# Patient Record
Sex: MALE | Race: WHITE | Employment: FULL TIME | URBAN - METROPOLITAN AREA
[De-identification: names, ages, dates, MRNs, and addresses within clinical notes are randomized per-mention and may not be internally consistent; named-entity substitution may affect disease eponyms.]

---

## 2022-12-09 LAB
BUN BLDV-MCNC: 22 MG/DL
CALCIUM SERPL-MCNC: 9.2 MG/DL
CHLORIDE BLD-SCNC: 106 MMOL/L
CO2: 25 MMOL/L
CREAT SERPL-MCNC: 1.24 MG/DL
EGFR: 59
GLUCOSE BLD-MCNC: 89 MG/DL
MICROSCOPIC URINE: NORMAL
POTASSIUM SERPL-SCNC: 3.9 MMOL/L
SODIUM BLD-SCNC: 140 MMOL/L

## 2023-07-07 ENCOUNTER — HOSPITAL ENCOUNTER (OUTPATIENT)
Age: 63
Discharge: HOME | End: 2023-07-07
Payer: COMMERCIAL

## 2023-07-07 ENCOUNTER — HOSPITAL ENCOUNTER (OUTPATIENT)
Dept: GENERAL RADIOLOGY | Age: 63
End: 2023-07-07
Payer: COMMERCIAL

## 2023-07-07 DIAGNOSIS — M25.521 ELBOW PAIN, RIGHT: ICD-10-CM

## 2023-07-07 PROCEDURE — 73080 X-RAY EXAM OF ELBOW: CPT

## 2023-09-27 VITALS
TEMPERATURE: 97.2 F | SYSTOLIC BLOOD PRESSURE: 120 MMHG | RESPIRATION RATE: 16 BRPM | DIASTOLIC BLOOD PRESSURE: 80 MMHG | BODY MASS INDEX: 30.21 KG/M2 | WEIGHT: 235.4 LBS | HEIGHT: 74 IN

## 2023-09-27 DIAGNOSIS — M1A.09X0 IDIOPATHIC CHRONIC GOUT OF MULTIPLE SITES WITHOUT TOPHUS: ICD-10-CM

## 2023-09-27 DIAGNOSIS — M19.90 INFLAMMATORY ARTHRITIS: ICD-10-CM

## 2023-09-27 DIAGNOSIS — N18.2 STAGE 2 CHRONIC KIDNEY DISEASE: ICD-10-CM

## 2023-09-27 DIAGNOSIS — I26.94 MULTIPLE SUBSEGMENTAL PULMONARY EMBOLI WITHOUT ACUTE COR PULMONALE (HCC): ICD-10-CM

## 2023-09-27 DIAGNOSIS — K21.9 GASTROESOPHAGEAL REFLUX DISEASE WITHOUT ESOPHAGITIS: ICD-10-CM

## 2023-09-27 DIAGNOSIS — M47.812 CERVICAL SPONDYLOSIS: ICD-10-CM

## 2023-09-27 DIAGNOSIS — M25.529 ELBOW PAIN, UNSPECIFIED LATERALITY: ICD-10-CM

## 2023-09-27 DIAGNOSIS — N52.9 ERECTILE DYSFUNCTION, UNSPECIFIED ERECTILE DYSFUNCTION TYPE: ICD-10-CM

## 2023-09-27 DIAGNOSIS — E78.2 MIXED DYSLIPIDEMIA: ICD-10-CM

## 2023-09-27 DIAGNOSIS — M47.816 LUMBAR SPONDYLOSIS: ICD-10-CM

## 2023-09-27 DIAGNOSIS — R53.82 CHRONIC FATIGUE: ICD-10-CM

## 2023-09-27 DIAGNOSIS — M19.011 PRIMARY OSTEOARTHRITIS OF RIGHT SHOULDER: ICD-10-CM

## 2023-09-27 DIAGNOSIS — F51.01 PRIMARY INSOMNIA: ICD-10-CM

## 2023-09-27 DIAGNOSIS — M54.30 SCIATIC LEG PAIN: ICD-10-CM

## 2023-09-27 RX ORDER — DICLOFENAC POTASSIUM 50 MG/1
TABLET, FILM COATED ORAL
COMMUNITY

## 2023-09-27 RX ORDER — SILDENAFIL 100 MG/1
100 TABLET, FILM COATED ORAL PRN
COMMUNITY

## 2023-09-27 RX ORDER — TRAZODONE HYDROCHLORIDE 50 MG/1
50 TABLET ORAL NIGHTLY
COMMUNITY
Start: 2021-02-17

## 2023-09-28 ENCOUNTER — OFFICE VISIT (OUTPATIENT)
Age: 63
End: 2023-09-28
Payer: COMMERCIAL

## 2023-09-28 VITALS
HEART RATE: 68 BPM | RESPIRATION RATE: 14 BRPM | BODY MASS INDEX: 30.16 KG/M2 | HEIGHT: 74 IN | DIASTOLIC BLOOD PRESSURE: 80 MMHG | SYSTOLIC BLOOD PRESSURE: 130 MMHG | TEMPERATURE: 97.2 F | WEIGHT: 235 LBS

## 2023-09-28 DIAGNOSIS — M25.521 ELBOW PAIN, CHRONIC, RIGHT: ICD-10-CM

## 2023-09-28 DIAGNOSIS — G89.29 ELBOW PAIN, CHRONIC, RIGHT: ICD-10-CM

## 2023-09-28 DIAGNOSIS — R79.89 ELEVATED SERUM CREATININE: ICD-10-CM

## 2023-09-28 DIAGNOSIS — M10.9 GOUT OF LEFT FOOT, UNSPECIFIED CAUSE, UNSPECIFIED CHRONICITY: ICD-10-CM

## 2023-09-28 DIAGNOSIS — R06.02 SHORTNESS OF BREATH: ICD-10-CM

## 2023-09-28 DIAGNOSIS — Z00.00 WELL ADULT EXAM: ICD-10-CM

## 2023-09-28 DIAGNOSIS — M19.90 INFLAMMATORY ARTHRITIS: Primary | ICD-10-CM

## 2023-09-28 DIAGNOSIS — N18.2 STAGE 2 CHRONIC KIDNEY DISEASE: ICD-10-CM

## 2023-09-28 DIAGNOSIS — Z12.5 PROSTATE CANCER SCREENING: ICD-10-CM

## 2023-09-28 PROCEDURE — 99214 OFFICE O/P EST MOD 30 MIN: CPT | Performed by: FAMILY MEDICINE

## 2023-09-28 RX ORDER — VALACYCLOVIR HYDROCHLORIDE 1 G/1
2000 TABLET, FILM COATED ORAL 2 TIMES DAILY
Qty: 4 TABLET | Refills: 4 | Status: SHIPPED | OUTPATIENT
Start: 2023-09-28

## 2023-09-28 RX ORDER — COLCHICINE 0.6 MG/1
TABLET ORAL
Qty: 30 TABLET | Refills: 1 | Status: SHIPPED | OUTPATIENT
Start: 2023-09-28

## 2023-09-28 SDOH — ECONOMIC STABILITY: HOUSING INSECURITY
IN THE LAST 12 MONTHS, WAS THERE A TIME WHEN YOU DID NOT HAVE A STEADY PLACE TO SLEEP OR SLEPT IN A SHELTER (INCLUDING NOW)?: NO

## 2023-09-28 SDOH — ECONOMIC STABILITY: INCOME INSECURITY: HOW HARD IS IT FOR YOU TO PAY FOR THE VERY BASICS LIKE FOOD, HOUSING, MEDICAL CARE, AND HEATING?: NOT HARD AT ALL

## 2023-09-28 SDOH — ECONOMIC STABILITY: FOOD INSECURITY: WITHIN THE PAST 12 MONTHS, YOU WORRIED THAT YOUR FOOD WOULD RUN OUT BEFORE YOU GOT MONEY TO BUY MORE.: NEVER TRUE

## 2023-09-28 SDOH — ECONOMIC STABILITY: FOOD INSECURITY: WITHIN THE PAST 12 MONTHS, THE FOOD YOU BOUGHT JUST DIDN'T LAST AND YOU DIDN'T HAVE MONEY TO GET MORE.: NEVER TRUE

## 2023-09-28 ASSESSMENT — PATIENT HEALTH QUESTIONNAIRE - PHQ9
2. FEELING DOWN, DEPRESSED OR HOPELESS: 0
SUM OF ALL RESPONSES TO PHQ QUESTIONS 1-9: 0
SUM OF ALL RESPONSES TO PHQ QUESTIONS 1-9: 0
SUM OF ALL RESPONSES TO PHQ9 QUESTIONS 1 & 2: 0
SUM OF ALL RESPONSES TO PHQ QUESTIONS 1-9: 0
SUM OF ALL RESPONSES TO PHQ QUESTIONS 1-9: 0
1. LITTLE INTEREST OR PLEASURE IN DOING THINGS: 0

## 2023-09-28 NOTE — ASSESSMENT & PLAN NOTE
Last visit :  Reviewed normal CBC, creatinine 1.25 which is stable, earlier this year it was 1.24, last spring it was 1.45 after stopping NSAIDs this has come down near his baseline, continue to monitor, adequate hydration encouraged, avoid NSAIDs.

## 2023-10-02 PROBLEM — M10.9 GOUT OF LEFT FOOT: Status: ACTIVE | Noted: 2023-10-02

## 2023-10-02 RX ORDER — SILDENAFIL 100 MG/1
TABLET, FILM COATED ORAL
Qty: 6 TABLET | Refills: 3 | Status: SHIPPED | OUTPATIENT
Start: 2023-10-02

## 2023-10-02 ASSESSMENT — ENCOUNTER SYMPTOMS: BACK PAIN: 1

## 2023-10-02 NOTE — TELEPHONE ENCOUNTER
Jay Rock is calling to request a refill on the following medication(s):    Medication Request:  Requested Prescriptions     Pending Prescriptions Disp Refills    sildenafil (VIAGRA) 100 MG tablet [Pharmacy Med Name: SILDENAFIL 100 MG TABLET] 6 tablet      Sig: TAKE 1 TABLET BY MOUTH 1 HOUR PRIOR TO INTERCOURSE AS NEEDED       Last Visit Date (If Applicable):  1/18/5650    Next Visit Date:    5/30/2024

## 2023-10-03 NOTE — ASSESSMENT & PLAN NOTE
continue to follow with Ortho, contact them for results of EMG whether he has carpal tunnel and further treatment of this.   He should follow-up with Ortho regarding any possibility for short term disability if he has carpal tunnel procedure or surgery Burow's Advancement Flap Text: The defect edges were debeveled with a #15 scalpel blade.  Given the location of the defect and the proximity to free margins a Burow's advancement flap was deemed most appropriate.  Using a sterile surgical marker, the appropriate advancement flap was drawn incorporating the defect and placing the expected incisions within the relaxed skin tension lines where possible.    The area thus outlined was incised deep to adipose tissue with a #15 scalpel blade.  The skin margins were undermined to an appropriate distance in all directions utilizing iris scissors.

## 2023-10-03 NOTE — ASSESSMENT & PLAN NOTE
he is not taking anything for arthritis at this time. He was on low-dose prednisone per rheumatology but he discontinued this as well as other anti-inflammatories he had tried due to it causing elevation of serum creatinine. Takes Tylenol as needed.   No longer following with rheumatology per his preference

## 2023-11-20 ENCOUNTER — TELEPHONE (OUTPATIENT)
Age: 63
End: 2023-11-20

## 2023-11-20 RX ORDER — AZITHROMYCIN 250 MG/1
250 TABLET, FILM COATED ORAL SEE ADMIN INSTRUCTIONS
Qty: 6 TABLET | Refills: 0 | Status: SHIPPED | OUTPATIENT
Start: 2023-11-20 | End: 2023-11-25

## 2023-11-20 NOTE — TELEPHONE ENCOUNTER
Patient has a headache, sore throat, sinus drainage, yellow mucus. Symptoms started about 2 days ago. Worse last night and today feels terrible.      Send to [facility]    (Patient heading home now and will take a covid test and call us back)

## 2024-05-09 NOTE — TELEPHONE ENCOUNTER
Patient is requesting a refill on Trazodone 50mg He would like sent to [facility] in [place] phone [phone] . [address] [facility] [place] [facility]

## 2024-05-09 NOTE — TELEPHONE ENCOUNTER
Ad Ryan is calling to request a refill on the following medication(s):    Medication Request:  Requested Prescriptions     Pending Prescriptions Disp Refills    traZODone (DESYREL) 50 MG tablet 30 tablet 0     Sig: Take 1 tablet by mouth nightly       Last Visit Date (If Applicable):  9/28/2023    Next Visit Date:    Visit date not found

## 2024-05-12 RX ORDER — TRAZODONE HYDROCHLORIDE 50 MG/1
50 TABLET ORAL NIGHTLY
Qty: 30 TABLET | Refills: 5 | Status: SHIPPED | OUTPATIENT
Start: 2024-05-12 | End: 2024-06-11

## 2024-06-12 ENCOUNTER — TELEPHONE (OUTPATIENT)
Age: 64
End: 2024-06-12

## 2024-06-12 NOTE — TELEPHONE ENCOUNTER
I faxed medical request forms from [place] dept of health division of disability determination to [facility] medical records department.

## 2024-08-06 ENCOUNTER — HOSPITAL ENCOUNTER (OUTPATIENT)
Age: 64
Discharge: HOME | End: 2024-08-06
Payer: COMMERCIAL

## 2024-08-06 DIAGNOSIS — Z12.5 PROSTATE CANCER SCREENING: ICD-10-CM

## 2024-08-06 DIAGNOSIS — Z00.00 WELL ADULT EXAM: ICD-10-CM

## 2024-08-06 DIAGNOSIS — M10.9 GOUT OF LEFT FOOT, UNSPECIFIED CAUSE, UNSPECIFIED CHRONICITY: ICD-10-CM

## 2024-08-06 LAB
ALBUMIN SERPL-MCNC: 4.6 G/DL (ref 3.5–5.2)
ALBUMIN/GLOB SERPL: 2.1 {RATIO} (ref 1–2.5)
ALP SERPL-CCNC: 84 U/L (ref 40–129)
ALT SERPL-CCNC: 20 U/L (ref 5–41)
ANION GAP SERPL CALCULATED.3IONS-SCNC: 12 MMOL/L (ref 9–17)
AST SERPL-CCNC: 18 U/L
BILIRUB DIRECT SERPL-MCNC: <0.1 MG/DL
BILIRUB INDIRECT SERPL-MCNC: NORMAL MG/DL (ref 0–1)
BILIRUB SERPL-MCNC: 0.8 MG/DL (ref 0.3–1.2)
BUN SERPL-MCNC: 17 MG/DL (ref 8–23)
CALCIUM SERPL-MCNC: 9.3 MG/DL (ref 8.6–10.4)
CHLORIDE SERPL-SCNC: 101 MMOL/L (ref 98–107)
CHOLEST SERPL-MCNC: 204 MG/DL (ref 0–199)
CHOLESTEROL/HDL RATIO: 5
CO2 SERPL-SCNC: 26 MMOL/L (ref 20–31)
CREAT SERPL-MCNC: 1.2 MG/DL (ref 0.7–1.2)
EST. AVERAGE GLUCOSE BLD GHB EST-MCNC: 103 MG/DL
GFR, ESTIMATED: 69 ML/MIN/1.73M2
GLUCOSE SERPL-MCNC: 92 MG/DL (ref 70–99)
HBA1C MFR BLD: 5.2 % (ref 4–6)
HDLC SERPL-MCNC: 41 MG/DL
LDLC SERPL CALC-MCNC: 130 MG/DL (ref 0–100)
POTASSIUM SERPL-SCNC: 4.1 MMOL/L (ref 3.7–5.3)
PROT SERPL-MCNC: 6.8 G/DL (ref 6.4–8.3)
PSA SERPL-MCNC: 2 NG/ML (ref 0–4)
SODIUM SERPL-SCNC: 139 MMOL/L (ref 135–144)
TRIGL SERPL-MCNC: 164 MG/DL (ref 0–149)
URATE SERPL-MCNC: 8.3 MG/DL (ref 3.4–7)
VLDLC SERPL CALC-MCNC: 33 MG/DL

## 2024-08-06 PROCEDURE — 82248 BILIRUBIN DIRECT: CPT

## 2024-08-06 PROCEDURE — 36415 COLL VENOUS BLD VENIPUNCTURE: CPT

## 2024-08-06 PROCEDURE — 84550 ASSAY OF BLOOD/URIC ACID: CPT

## 2024-08-06 PROCEDURE — 80061 LIPID PANEL: CPT

## 2024-08-06 PROCEDURE — 83036 HEMOGLOBIN GLYCOSYLATED A1C: CPT

## 2024-08-06 PROCEDURE — 80053 COMPREHEN METABOLIC PANEL: CPT

## 2024-08-06 PROCEDURE — G0103 PSA SCREENING: HCPCS

## 2024-08-14 ENCOUNTER — OFFICE VISIT (OUTPATIENT)
Age: 64
End: 2024-08-14
Payer: COMMERCIAL

## 2024-08-14 VITALS
TEMPERATURE: 98.2 F | HEIGHT: 74 IN | DIASTOLIC BLOOD PRESSURE: 80 MMHG | SYSTOLIC BLOOD PRESSURE: 130 MMHG | WEIGHT: 241.2 LBS | OXYGEN SATURATION: 96 % | HEART RATE: 67 BPM | BODY MASS INDEX: 30.96 KG/M2

## 2024-08-14 DIAGNOSIS — M10.9 GOUT OF LEFT FOOT, UNSPECIFIED CAUSE, UNSPECIFIED CHRONICITY: ICD-10-CM

## 2024-08-14 DIAGNOSIS — G89.29 ELBOW PAIN, CHRONIC, RIGHT: ICD-10-CM

## 2024-08-14 DIAGNOSIS — R39.11 BENIGN PROSTATIC HYPERPLASIA WITH URINARY HESITANCY: ICD-10-CM

## 2024-08-14 DIAGNOSIS — N40.1 BENIGN PROSTATIC HYPERPLASIA WITH URINARY HESITANCY: ICD-10-CM

## 2024-08-14 DIAGNOSIS — M25.521 ELBOW PAIN, CHRONIC, RIGHT: ICD-10-CM

## 2024-08-14 DIAGNOSIS — Z12.5 PROSTATE CANCER SCREENING: ICD-10-CM

## 2024-08-14 DIAGNOSIS — R79.89 ELEVATED SERUM CREATININE: Primary | ICD-10-CM

## 2024-08-14 DIAGNOSIS — M19.90 INFLAMMATORY ARTHRITIS: ICD-10-CM

## 2024-08-14 PROCEDURE — 99214 OFFICE O/P EST MOD 30 MIN: CPT | Performed by: FAMILY MEDICINE

## 2024-08-14 RX ORDER — TAMSULOSIN HYDROCHLORIDE 0.4 MG/1
0.4 CAPSULE ORAL DAILY
Qty: 30 CAPSULE | Refills: 3 | Status: SHIPPED | OUTPATIENT
Start: 2024-08-14

## 2024-08-14 RX ORDER — COLCHICINE 0.6 MG/1
0.6 TABLET ORAL DAILY
Qty: 30 TABLET | Refills: 5 | Status: SHIPPED | OUTPATIENT
Start: 2024-08-14

## 2024-08-14 ASSESSMENT — PATIENT HEALTH QUESTIONNAIRE - PHQ9
SUM OF ALL RESPONSES TO PHQ QUESTIONS 1-9: 0
SUM OF ALL RESPONSES TO PHQ QUESTIONS 1-9: 0
2. FEELING DOWN, DEPRESSED OR HOPELESS: NOT AT ALL
SUM OF ALL RESPONSES TO PHQ QUESTIONS 1-9: 0
SUM OF ALL RESPONSES TO PHQ9 QUESTIONS 1 & 2: 0
1. LITTLE INTEREST OR PLEASURE IN DOING THINGS: NOT AT ALL
SUM OF ALL RESPONSES TO PHQ QUESTIONS 1-9: 0

## 2024-08-14 NOTE — ASSESSMENT & PLAN NOTE
numbness and tingling in the hand improved with ulnar release done in the last year.  Continues to have arthritic symptoms which affect his ability to fully extend the elbow and perform ADLs with that arm.  Discussed trying colchicine daily for arthritis and Tylenol.

## 2024-08-14 NOTE — PROGRESS NOTES
[facility]     Date of Visit:  2024  Patient Name: Ad Ryan   Patient :  1960     CHIEF COMPLAINT/HPI:     Ad Ryan is a 63 y.o. male who presents today for an general visit to be evaluated for the following condition(s):  Chief Complaint   Patient presents with    Blood Work     He is here to discuss his labs.  He says that his arthritis is really getting worse.      Patient here for routine visit.  He built a house in [place] and will be there most of the year but plans to return here occasionally.  His arthritis symptoms are debilitating.  His hands are stiff and painful.  It affects his ability to  things, opening jars, and fine motor abilities.  His right elbow cannot be fully extended due to arthritis.  He did have numbness in his hand, had ulnar release by Ortho in the fall and that has helped some the numbness in the hand.  His right shoulder is starting to ache as well.  No injury.  Rheumatology had him on prednisone and other anti-inflammatories which caused side effects and acute kidney injury.  Denies chest pain or shortness of breath.  He is going to try to wean off trazodone, taking valerian root.  He takes colchicine as needed for gout but has not had any flareups.  He has noted some difficulty urinating, getting urine stream started more recently but no dysuria or hematuria.  REVIEW OF SYSTEM      Negative other than noted above      REVIEWED INFORMATION      Allergies   Allergen Reactions    Diclofenac Potassium      YASH    Meloxicam      YASH       Current Outpatient Medications   Medication Sig Dispense Refill    tamsulosin (FLOMAX) 0.4 MG capsule Take 1 capsule by mouth daily 30 capsule 3    colchicine (COLCRYS) 0.6 MG tablet Take 1 tablet by mouth daily 2 tabs at onset of gout, repeat 1 tab 1 hour later if symptoms persist; max 3 tabs daily 30 tablet 5    traZODone (DESYREL) 50 MG tablet Take 1 tablet by mouth nightly 30 tablet 5    sildenafil

## 2024-08-14 NOTE — ASSESSMENT & PLAN NOTE
discussed taking Tylenol Extra Strength 500 mg tablets take 2 tabs 2-3 times daily, start colchicine daily, check BMP in a month to make sure no YASH with taking colchicine regularly.  Did not tolerate meloxicam or diclofenac or prednisone per her rheumatologist.  He is applying full disability as the arthritis limits his ability to do his work affecting fine motor ability strength of hands and use of elbow.

## 2024-09-12 ENCOUNTER — TELEPHONE (OUTPATIENT)
Age: 64
End: 2024-09-12

## 2024-09-12 DIAGNOSIS — M10.9 GOUT OF LEFT FOOT, UNSPECIFIED CAUSE, UNSPECIFIED CHRONICITY: ICD-10-CM

## 2024-09-13 RX ORDER — COLCHICINE 0.6 MG/1
0.6 TABLET ORAL DAILY
Qty: 30 TABLET | Refills: 5 | Status: SHIPPED | OUTPATIENT
Start: 2024-09-13

## 2024-09-13 RX ORDER — TAMSULOSIN HYDROCHLORIDE 0.4 MG/1
0.4 CAPSULE ORAL DAILY
Qty: 30 CAPSULE | Refills: 5 | Status: SHIPPED | OUTPATIENT
Start: 2024-09-13

## 2024-09-16 RX ORDER — TRAZODONE HYDROCHLORIDE 50 MG/1
50 TABLET, FILM COATED ORAL NIGHTLY
Qty: 30 TABLET | Refills: 5 | Status: SHIPPED | OUTPATIENT
Start: 2024-09-16

## 2024-10-04 LAB
BUN BLDV-MCNC: 15 MG/DL
CALCIUM SERPL-MCNC: 9.6 MG/DL
CHLORIDE BLD-SCNC: 104 MMOL/L
CO2: 29 MMOL/L
CREAT SERPL-MCNC: 1.31 MG/DL
EGFR: NORMAL
GLUCOSE BLD-MCNC: 92 MG/DL
POTASSIUM SERPL-SCNC: 4.7 MMOL/L
SODIUM BLD-SCNC: 141 MMOL/L

## 2024-10-08 DIAGNOSIS — R79.89 ELEVATED SERUM CREATININE: ICD-10-CM

## 2024-10-30 ENCOUNTER — TELEPHONE (OUTPATIENT)
Age: 64
End: 2024-10-30

## 2024-10-30 NOTE — TELEPHONE ENCOUNTER
PT called to say that he may not be back in town for the 11/6/24 office visit and mentioned if not in town, do a virtual visit.? He did say it was just to discuss new meds and blood work and it could be something that can be done over phone.

## 2024-10-30 NOTE — TELEPHONE ENCOUNTER
It is difficult to do virtual visits at the time he is scheduled, may be if the 1220 patient could switch appointment slots with him so that he is at the 1220 slot, that would be a better time for virtual visit.

## 2024-11-06 ENCOUNTER — TELEMEDICINE (OUTPATIENT)
Age: 64
End: 2024-11-06
Payer: COMMERCIAL

## 2024-11-06 DIAGNOSIS — N40.1 BENIGN PROSTATIC HYPERPLASIA WITH URINARY HESITANCY: Primary | ICD-10-CM

## 2024-11-06 DIAGNOSIS — M19.90 INFLAMMATORY ARTHRITIS: ICD-10-CM

## 2024-11-06 DIAGNOSIS — R39.11 BENIGN PROSTATIC HYPERPLASIA WITH URINARY HESITANCY: Primary | ICD-10-CM

## 2024-11-06 DIAGNOSIS — Z86.711 HISTORY OF PULMONARY EMBOLISM: ICD-10-CM

## 2024-11-06 DIAGNOSIS — N18.2 STAGE 2 CHRONIC KIDNEY DISEASE: ICD-10-CM

## 2024-11-06 DIAGNOSIS — I26.94 MULTIPLE SUBSEGMENTAL PULMONARY EMBOLI WITHOUT ACUTE COR PULMONALE (HCC): ICD-10-CM

## 2024-11-06 DIAGNOSIS — M10.9 GOUT OF LEFT FOOT, UNSPECIFIED CAUSE, UNSPECIFIED CHRONICITY: ICD-10-CM

## 2024-11-06 PROCEDURE — 99214 OFFICE O/P EST MOD 30 MIN: CPT | Performed by: FAMILY MEDICINE

## 2024-11-06 RX ORDER — NABUMETONE 500 MG/1
500 TABLET, FILM COATED ORAL 2 TIMES DAILY
Qty: 60 TABLET | Refills: 5 | Status: SHIPPED | OUTPATIENT
Start: 2024-11-06

## 2024-11-06 RX ORDER — DIMENHYDRINATE 50 MG
TABLET ORAL
COMMUNITY

## 2024-11-06 RX ORDER — TADALAFIL 5 MG/1
5 TABLET ORAL DAILY
Qty: 30 TABLET | Refills: 5 | Status: SHIPPED | OUTPATIENT
Start: 2024-11-06

## 2024-11-06 RX ORDER — ASPIRIN 81 MG/1
TABLET, CHEWABLE ORAL
COMMUNITY

## 2024-11-06 RX ORDER — HYDROCODONE BITARTRATE AND ACETAMINOPHEN 5; 325 MG/1; MG/1
1-2 TABLET ORAL EVERY 6 HOURS PRN
COMMUNITY
Start: 2023-10-18

## 2024-11-06 RX ORDER — SILDENAFIL 100 MG/1
100 TABLET, FILM COATED ORAL PRN
Qty: 6 TABLET | Refills: 3 | Status: SHIPPED | OUTPATIENT
Start: 2024-11-06

## 2024-11-06 SDOH — ECONOMIC STABILITY: FOOD INSECURITY: WITHIN THE PAST 12 MONTHS, THE FOOD YOU BOUGHT JUST DIDN'T LAST AND YOU DIDN'T HAVE MONEY TO GET MORE.: NEVER TRUE

## 2024-11-06 SDOH — ECONOMIC STABILITY: INCOME INSECURITY: HOW HARD IS IT FOR YOU TO PAY FOR THE VERY BASICS LIKE FOOD, HOUSING, MEDICAL CARE, AND HEATING?: NOT VERY HARD

## 2024-11-06 SDOH — ECONOMIC STABILITY: TRANSPORTATION INSECURITY
IN THE PAST 12 MONTHS, HAS LACK OF TRANSPORTATION KEPT YOU FROM MEETINGS, WORK, OR FROM GETTING THINGS NEEDED FOR DAILY LIVING?: NO

## 2024-11-06 SDOH — ECONOMIC STABILITY: FOOD INSECURITY: WITHIN THE PAST 12 MONTHS, YOU WORRIED THAT YOUR FOOD WOULD RUN OUT BEFORE YOU GOT MONEY TO BUY MORE.: NEVER TRUE

## 2024-11-06 NOTE — PROGRESS NOTES
[facility]  [facility]  [address]  [place] [id number]  Dept: [phone]     Date of Visit:  24  Patient Name: Ad Ryan   Patient :  1960     CHIEF COMPLAINT/HPI:     Ad Ryan is a 64 y.o. male who presents today for an general visit to be evaluated for the following condition(s):  Chief Complaint   Patient presents with    Discuss Medications    Discuss Labs     Ad Ryan, was evaluated through a synchronous (real-time) audio-video encounter. The patient (or guardian if applicable) is aware that this is a billable service, which includes applicable co-pays. This Virtual Visit was conducted with patient's (and/or legal guardian's) consent. Patient identification was verified, and a caregiver was present when appropriate.   The patient was located at Home: [address] [place] [address]  Provider was located at Facility (Appt Dept): [address] [place] [address]. [place] [address]  Patient requested televisit due to being in [place] at this time.  Difficulty traveling back for follow-up on medications from last appointment.  He had described diffuse joint aches and pains particularly in hands fingers and right elbow.  He has had workup for this and seen Ortho and rheumatology, diagnosis of osteoarthritis.  He does have history of gout, labs at time of last appointment indicated elevated uric acid although he had not had any gout attacks around that time.  He was instructed to start colchicine daily to see if it helps his generalized arthritic aches and pains but it has not helped.He had BMP done recently for monitoring of renal function with taking colchicine daily.    He also started Flomax for difficulty with urination in terms of decreased force of stream and taking longer to empty bladder, urgency.  This has not helped his symptoms either.  No pain with urination or blood in urine.    He was

## 2024-11-07 PROBLEM — Z86.711 HISTORY OF PULMONARY EMBOLISM: Status: ACTIVE | Noted: 2024-11-07

## 2024-11-07 PROBLEM — I26.94 MULTIPLE SUBSEGMENTAL PULMONARY EMBOLI WITHOUT ACUTE COR PULMONALE (HCC): Status: RESOLVED | Noted: 2023-09-27 | Resolved: 2024-11-07

## 2024-11-07 NOTE — ASSESSMENT & PLAN NOTE
he has not tolerated meloxicam or diclofenac or prednisone per her rheumatologist.  No relief of symptoms with colchicine.  Reviewed BMP showing creatinine stable with taking colchicine.  Will try nabumetone, check BMP within a few weeks of starting this medication.  Try alternative NSAIDs if nabumetone provides no relief

## 2024-11-07 NOTE — ASSESSMENT & PLAN NOTE
reviewed previous records confirming CT chest done as outpatient after COVID illness showing bilateral subsegmental pulmonary emboli with no right heart strain in 2/2021, he was treated with 3 months of Eliquis and has had no residual symptoms or medical complications from this.

## 2024-11-07 NOTE — ASSESSMENT & PLAN NOTE
discussed using colchicine as needed for treatment dose of gout attack, check uric acid with next lab

## 2024-11-07 NOTE — ASSESSMENT & PLAN NOTE
creatinine on recent labs stable at 1.3 GFR stable, continue to monitor especially if we need to use NSAIDs for control of arthritic symptoms

## 2025-01-10 NOTE — TELEPHONE ENCOUNTER
Ad Ryan is calling to request a refill on the following medication(s):    Medication Request:  Requested Prescriptions     Pending Prescriptions Disp Refills    sildenafil (VIAGRA) 100 MG tablet [Pharmacy Med Name: SILDENAFIL 100 MG TABLET] 6 tablet 3     Sig: TAKE ONE TABLET BY MOUTH ONE TIME DAILY AS NEEDED FOR ERECTILE DYSFUNCTION       Last Visit Date (If Applicable):  11/6/2024    Next Visit Date:    Visit date not found

## 2025-01-13 RX ORDER — SILDENAFIL 100 MG/1
TABLET, FILM COATED ORAL
Qty: 6 TABLET | Refills: 3 | Status: SHIPPED | OUTPATIENT
Start: 2025-01-13

## 2025-01-26 ENCOUNTER — PATIENT MESSAGE (OUTPATIENT)
Age: 65
End: 2025-01-26

## 2025-01-27 RX ORDER — CYCLOBENZAPRINE HCL 10 MG
10 TABLET ORAL 3 TIMES DAILY PRN
Qty: 21 TABLET | Refills: 0 | Status: SHIPPED | OUTPATIENT
Start: 2025-01-27 | End: 2025-02-06

## 2025-03-17 RX ORDER — TRAZODONE HYDROCHLORIDE 50 MG/1
50 TABLET ORAL NIGHTLY
Qty: 30 TABLET | Refills: 5 | Status: SHIPPED | OUTPATIENT
Start: 2025-03-17

## 2025-08-22 SDOH — ECONOMIC STABILITY: INCOME INSECURITY: IN THE LAST 12 MONTHS, WAS THERE A TIME WHEN YOU WERE NOT ABLE TO PAY THE MORTGAGE OR RENT ON TIME?: NO

## 2025-08-22 SDOH — ECONOMIC STABILITY: FOOD INSECURITY: WITHIN THE PAST 12 MONTHS, YOU WORRIED THAT YOUR FOOD WOULD RUN OUT BEFORE YOU GOT MONEY TO BUY MORE.: NEVER TRUE

## 2025-08-22 SDOH — ECONOMIC STABILITY: FOOD INSECURITY: WITHIN THE PAST 12 MONTHS, THE FOOD YOU BOUGHT JUST DIDN'T LAST AND YOU DIDN'T HAVE MONEY TO GET MORE.: NEVER TRUE

## 2025-08-22 SDOH — ECONOMIC STABILITY: TRANSPORTATION INSECURITY
IN THE PAST 12 MONTHS, HAS THE LACK OF TRANSPORTATION KEPT YOU FROM MEDICAL APPOINTMENTS OR FROM GETTING MEDICATIONS?: NO

## 2025-08-25 ENCOUNTER — OFFICE VISIT (OUTPATIENT)
Age: 65
End: 2025-08-25
Payer: COMMERCIAL

## 2025-08-25 VITALS
DIASTOLIC BLOOD PRESSURE: 82 MMHG | SYSTOLIC BLOOD PRESSURE: 126 MMHG | HEIGHT: 74 IN | BODY MASS INDEX: 30.29 KG/M2 | WEIGHT: 236 LBS | HEART RATE: 66 BPM | OXYGEN SATURATION: 97 % | RESPIRATION RATE: 12 BRPM

## 2025-08-25 DIAGNOSIS — N40.1 BENIGN PROSTATIC HYPERPLASIA WITH URINARY HESITANCY: ICD-10-CM

## 2025-08-25 DIAGNOSIS — E78.5 DYSLIPIDEMIA: ICD-10-CM

## 2025-08-25 DIAGNOSIS — R39.11 BENIGN PROSTATIC HYPERPLASIA WITH URINARY HESITANCY: ICD-10-CM

## 2025-08-25 DIAGNOSIS — M1A.09X0 IDIOPATHIC CHRONIC GOUT OF MULTIPLE SITES WITHOUT TOPHUS: ICD-10-CM

## 2025-08-25 DIAGNOSIS — R73.9 HYPERGLYCEMIA: ICD-10-CM

## 2025-08-25 DIAGNOSIS — N18.2 STAGE 2 CHRONIC KIDNEY DISEASE: ICD-10-CM

## 2025-08-25 DIAGNOSIS — M10.9 GOUT OF LEFT FOOT, UNSPECIFIED CAUSE, UNSPECIFIED CHRONICITY: ICD-10-CM

## 2025-08-25 DIAGNOSIS — R53.83 FATIGUE, UNSPECIFIED TYPE: ICD-10-CM

## 2025-08-25 DIAGNOSIS — M19.90 INFLAMMATORY ARTHRITIS: Primary | ICD-10-CM

## 2025-08-25 DIAGNOSIS — Z12.5 PROSTATE CANCER SCREENING: ICD-10-CM

## 2025-08-25 PROCEDURE — 99215 OFFICE O/P EST HI 40 MIN: CPT | Performed by: FAMILY MEDICINE

## 2025-08-25 RX ORDER — CYCLOBENZAPRINE HCL 10 MG
10 TABLET ORAL 3 TIMES DAILY PRN
COMMUNITY

## 2025-08-25 RX ORDER — CYCLOBENZAPRINE HCL 10 MG
10 TABLET ORAL 3 TIMES DAILY PRN
Qty: 30 TABLET | Refills: 2 | Status: SHIPPED | OUTPATIENT
Start: 2025-08-25 | End: 2025-09-04

## 2025-08-25 RX ORDER — TADALAFIL 5 MG/1
5 TABLET ORAL DAILY
Qty: 30 TABLET | Refills: 5 | Status: SHIPPED | OUTPATIENT
Start: 2025-08-25

## 2025-08-25 SDOH — ECONOMIC STABILITY: FOOD INSECURITY: WITHIN THE PAST 12 MONTHS, YOU WORRIED THAT YOUR FOOD WOULD RUN OUT BEFORE YOU GOT MONEY TO BUY MORE.: NEVER TRUE

## 2025-08-25 SDOH — ECONOMIC STABILITY: FOOD INSECURITY: WITHIN THE PAST 12 MONTHS, THE FOOD YOU BOUGHT JUST DIDN'T LAST AND YOU DIDN'T HAVE MONEY TO GET MORE.: NEVER TRUE

## 2025-08-25 ASSESSMENT — PATIENT HEALTH QUESTIONNAIRE - PHQ9
SUM OF ALL RESPONSES TO PHQ QUESTIONS 1-9: 0
SUM OF ALL RESPONSES TO PHQ QUESTIONS 1-9: 0
1. LITTLE INTEREST OR PLEASURE IN DOING THINGS: NOT AT ALL
SUM OF ALL RESPONSES TO PHQ QUESTIONS 1-9: 0
SUM OF ALL RESPONSES TO PHQ QUESTIONS 1-9: 0
2. FEELING DOWN, DEPRESSED OR HOPELESS: NOT AT ALL

## 2025-08-26 ENCOUNTER — TELEPHONE (OUTPATIENT)
Age: 65
End: 2025-08-26

## 2025-08-29 LAB
ALBUMIN: 4.6 G/DL (ref 3.5–5.2)
ALK PHOSPHATASE: 93 U/L (ref 39–118)
ALT SERPL-CCNC: 26 U/L (ref 5–41)
ANION GAP SERPL CALCULATED.3IONS-SCNC: 15 MMOL/L (ref 7–16)
AST SERPL-CCNC: 18 U/L (ref 9–50)
BASOPHILS ABSOLUTE: 0.04 K/UL (ref 0–0.2)
BASOPHILS RELATIVE PERCENT: 0.6 % (ref 0–2)
BILIRUB SERPL-MCNC: 0.7 MG/DL
BUN BLDV-MCNC: 15 MG/DL (ref 8–23)
CALCIUM SERPL-MCNC: 9.8 MG/DL (ref 8.6–10.5)
CHLORIDE BLD-SCNC: 105 MMOL/L (ref 96–107)
CHOLESTEROL, TOTAL: 217 MG/DL (ref 100–199)
CHOLESTEROL/HDL RATIO: 5.3 (ref 2–4.5)
CO2: 25 MMOL/L (ref 18–32)
CREAT SERPL-MCNC: 1.37 MG/DL (ref 0.67–1.3)
EGFR IF NONAFRICAN AMERICAN: 58 ML/MIN/1.73M2
EOSINOPHILS ABSOLUTE: 0.15 K/UL (ref 0–0.8)
EOSINOPHILS RELATIVE PERCENT: 2.3 % (ref 0–5)
ESTIMATED AVERAGE GLUCOSE: 111 MG/DL
GLUCOSE: 89 MG/DL (ref 70–100)
HBA1C MFR BLD: 5.5 %
HCT VFR BLD CALC: 48.2 % (ref 39–52)
HDLC SERPL-MCNC: 41 MG/DL
HEMOGLOBIN: 16 G/DL (ref 13–18)
IMMATURE GRANS (ABS): 0.01 K/UL (ref 0–0.06)
IMMATURE GRANULOCYTES %: 0.2 % (ref 0–2)
LDL CHOLESTEROL: 157 MG/DL
LDL/HDL RATIO: 3.8
LYMPHOCYTES ABSOLUTE: 2.34 K/UL (ref 0.9–5.2)
LYMPHOCYTES RELATIVE PERCENT: 36.6 % (ref 20–45)
MCH RBC QN AUTO: 30.8 PG (ref 26–32)
MCHC RBC AUTO-ENTMCNC: 33.2 G/DL (ref 31–36)
MCV RBC AUTO: 93 FL (ref 75–100)
MONOCYTES ABSOLUTE: 0.39 K/UL (ref 0.1–1)
MONOCYTES RELATIVE PERCENT: 6.1 % (ref 0–13)
NEUTROPHILS ABSOLUTE: 3.46 K/UL (ref 1.9–8)
NEUTROPHILS RELATIVE PERCENT: 54.2 % (ref 45–75)
PDW BLD-RTO: 13.3 % (ref 11.2–14.8)
PLATELET # BLD: 241 THOUS/CMM (ref 140–440)
POTASSIUM SERPL-SCNC: 4.7 MMOL/L (ref 3.5–5.4)
PSA, ULTRASENSITIVE: 2.48 NG/ML (ref 0–4)
RBC # BLD: 5.19 MILL/CMM (ref 4.4–6.1)
SODIUM BLD-SCNC: 145 MMOL/L (ref 135–148)
TOTAL PROTEIN: 6.5 G/DL (ref 6–8.3)
TRIGL SERPL-MCNC: 97 MG/DL (ref 20–149)
TSH SERPL DL<=0.05 MIU/L-ACNC: 1.69 UIU/ML (ref 0.27–4.2)
VLDLC SERPL CALC-MCNC: 19 MG/DL
WBC # BLD: 6.4 THDS/CMM (ref 3.6–11)